# Patient Record
Sex: MALE | Race: WHITE
[De-identification: names, ages, dates, MRNs, and addresses within clinical notes are randomized per-mention and may not be internally consistent; named-entity substitution may affect disease eponyms.]

---

## 2019-08-27 ENCOUNTER — HOSPITAL ENCOUNTER (OUTPATIENT)
Dept: HOSPITAL 59 - SUR | Age: 49
LOS: 1 days | Discharge: HOME | End: 2019-08-28
Attending: ORTHOPAEDIC SURGERY
Payer: COMMERCIAL

## 2019-08-27 DIAGNOSIS — Z95.1: ICD-10-CM

## 2019-08-27 DIAGNOSIS — E78.00: ICD-10-CM

## 2019-08-27 DIAGNOSIS — K21.9: ICD-10-CM

## 2019-08-27 DIAGNOSIS — L40.9: ICD-10-CM

## 2019-08-27 DIAGNOSIS — E11.9: ICD-10-CM

## 2019-08-27 DIAGNOSIS — I10: ICD-10-CM

## 2019-08-27 DIAGNOSIS — I25.2: ICD-10-CM

## 2019-08-27 DIAGNOSIS — I25.10: ICD-10-CM

## 2019-08-27 DIAGNOSIS — M17.12: Primary | ICD-10-CM

## 2019-08-27 LAB
ABO GROUP: (no result)
ANTIBODY SCREEN: NEGATIVE
RH TYPE: NEGATIVE

## 2019-08-27 PROCEDURE — 64447 NJX AA&/STRD FEMORAL NRV IMG: CPT

## 2019-08-27 PROCEDURE — 86900 BLOOD TYPING SEROLOGIC ABO: CPT

## 2019-08-27 PROCEDURE — 36416 COLLJ CAPILLARY BLOOD SPEC: CPT

## 2019-08-27 PROCEDURE — 86850 RBC ANTIBODY SCREEN: CPT

## 2019-08-27 PROCEDURE — 27447 TOTAL KNEE ARTHROPLASTY: CPT

## 2019-08-27 PROCEDURE — 86901 BLOOD TYPING SEROLOGIC RH(D): CPT

## 2019-08-27 PROCEDURE — 82948 REAGENT STRIP/BLOOD GLUCOSE: CPT

## 2019-08-27 PROCEDURE — 85014 HEMATOCRIT: CPT

## 2019-08-27 PROCEDURE — 84132 ASSAY OF SERUM POTASSIUM: CPT

## 2019-08-27 PROCEDURE — 85018 HEMOGLOBIN: CPT

## 2019-08-27 PROCEDURE — 80048 BASIC METABOLIC PNL TOTAL CA: CPT

## 2019-08-27 PROCEDURE — 01402 ANES OPN/ARTH TOT KNE ARTHRP: CPT

## 2019-08-27 PROCEDURE — 20680 REMOVAL OF IMPLANT DEEP: CPT

## 2019-08-27 PROCEDURE — 76942 ECHO GUIDE FOR BIOPSY: CPT

## 2019-08-27 RX ADMIN — INSULIN HUMAN SCH UNIT: 100 INJECTION, SOLUTION PARENTERAL at 17:10

## 2019-08-27 RX ADMIN — HYDROCODONE BITARTRATE AND ACETAMINOPHEN PRN EACH: 325; 10 TABLET ORAL at 20:43

## 2019-08-27 RX ADMIN — HYDROCODONE BITARTRATE AND ACETAMINOPHEN PRN EACH: 325; 10 TABLET ORAL at 16:54

## 2019-08-27 RX ADMIN — LOSARTAN POTASSIUM SCH MG: 25 TABLET, FILM COATED ORAL at 22:12

## 2019-08-27 RX ADMIN — DOCUSATE SODIUM SCH MG: 100 TABLET, FILM COATED ORAL at 22:12

## 2019-08-27 RX ADMIN — METFORMIN HYDROCHLORIDE SCH MG: 500 TABLET ORAL at 22:13

## 2019-08-27 RX ADMIN — CEFAZOLIN SODIUM SCH MLS/HR: 2 SOLUTION INTRAVENOUS at 19:58

## 2019-08-27 RX ADMIN — DEXTROSE AND SODIUM CHLORIDE PRN MLS/HR: 5; .9 INJECTION, SOLUTION INTRAVENOUS at 19:45

## 2019-08-27 RX ADMIN — METOPROLOL TARTRATE SCH MG: 50 TABLET, FILM COATED ORAL at 22:13

## 2019-08-27 NOTE — REHAB EVALUATION
Patient Information





- Patient Information


Diagnosis: L knee DJD


Ordered Treatment: PT Evaluate and Treat


Status: Initial Evaluation


Surgery: Yes (L TKA)


Date of Surgery: 08/27/19


Past Medical/Surgical Hx: 


                          PAST MEDICAL/SURGICAL HISTORY





Past Surgical History            ACL RECONSTRUCTION LEFT KNEE


                                 MULTIPLE SCOPES LEFT KNEE


                                 QUINTEN


                                 QUAD BYPASS 2016


                                 EGD





PMH - Respiratory





Hx Respiratory Disorders         Yes


Hx Bronchitis                    Yes: NOTHING RECENT





PMH - Cardiovascular





Hx Cardiovascular Disorders      Yes


Hx Abnormal EKG                  Yes


Hx Cardiac Catheterization       Yes


Hx Heart Attack                  Yes: 2016


Hx Hypertension                  Yes: FAIR CONTROL ON MEDS


Hx Coronary Artery Disease       Yes


Hx Coronary Artery Bypass        Yes: QUAD 2016


Graft                            


Exercise Tolerance               Good





PMH - Neuro





Hx Neurological Disorders        Yes


Hx Headaches                     Yes: OCCASSIONALLY





PMH - GI





Hx Gastrointestinal Disorders    Yes


Hx Gastroesophageal Reflux       Yes: FAIR CONTROL ON MEDS





PMH - 





Hx Genitourinary Disorders       No





PMH - Endocrine





Hx Endocrine Disorders           Yes


Hx Diabetes                      Yes: DX'D MANY YEARS AGO


Hx of NIDDM                      Yes


Comment:                         DOESNT CHECK BLOOD SUGARS LAST A1C 7.9





PMH - Musculoskeletal





Hx Musculoskeletal Disorders     Yes


Hx Arthritis                     Yes: LEFT KNEE, HANDS


Hx Gout                          Yes





PMH - Psych





Hx Psychiatric Problems          Yes


Hx Depression                    Yes





PMH - Hematology/Oncology





Hx Hematology/Oncology           Yes


Disorders                        


Hx Blood Transfusion Reaction    No








Premorbid Status: Detail (The patient was independent with all mobility prior to

surgery.)


Social History: Detail (The patient lives alone in an apartment  with 20 steps 

at the enterance and one handrail. The bathroom is equipped with : a tub/shower 

combination, hand held shower, standard height toilet. There are no grab bars 

present in the bathroom. The patient has crutches.)


Precautions: Universal, Fall, Other (WBAT on the L LE.)





- Time With Patient


Total Time Spent With Patient (Min): 25


Treatment Procedures: Detail





Subjective Information





- Subjective Information


Per Patient (The patient had complaints of level 10 pain primarily in L hip.)





Objective Data





- Mental Status


Patient Orientation: Oriented x3





- Visual Perception


Appears within normal limits for therapeutic activities





- ROM


Not within normal limits (The patient's L knee is limited s/p surgery. All other

LE AROM is WNL.)





- Strength/Tone


Not within normal limits (The patient's LE strength was not tested however, was 

functional.)





- Bed Mobility


Independent (The patient was independent with supine to and from sit and 

scooting up in bed.)





- Transfers


Independent (The patient was independent with sit to and from stand transfer.)





- Balance


Balance Sitting: Good


Balance Standing: Good





- Sensation


Intact





- Gait


Detail (The patient ambulated around bed to chair aprox. 15 feet with CG for 

safety with crutches and TTWB on L LE (TTWB due to pain complaints). The patient

did have reports of decreased pain with ambulation and sat in chair apox 4 

minutes before complaining of nausea. Patient returned to bed. Ice packs were in

place and call light was within reach. RN was contacted and returned with pain 

medications.)





Therapy Assessment





- Therapy Assessment


Detail (Patient was independent with bed mobility and transfers. Ambulation was 

limited secondary to pain complaints. Feel the patient will porgress well once 

pain is controlled.)





Problem List





- Problem List


Physical Therapy Problem List: Detail (1) Decreased L knee AROM  2) Decreased L 

LE strength)





Goals





- Goals


Physical Therapy Goals: 1) The patient will ambulate community distances WBAT on

the L LE independently with appropriate distances.  2) The patient will ambulate

on a flight of stairs using proper technique with supervision for safety.  3) 

The patient will be independent with TKA HEP.

## 2019-08-28 LAB
ANION GAP SERPL CALC-SCNC: 15 MMOL/L (ref 7–16)
BUN SERPL-MCNC: 18 MG/DL (ref 6–20)
CO2 SERPL-SCNC: 21 MMOL/L (ref 22–29)
CREAT SERPL-MCNC: 1 MG/DL (ref 0.7–1.2)
EST GLOMERULAR FILTRATION RATE: > 60 ML/MIN
GLUCOSE SERPL-MCNC: 292 MG/DL (ref 74–109)
HCT VFR BLD CALC: 36.9 % (ref 42–52)
HGB BLD-MCNC: 12.2 GM/DL (ref 14–18)

## 2019-08-28 RX ADMIN — DOCUSATE SODIUM SCH MG: 100 TABLET, FILM COATED ORAL at 09:15

## 2019-08-28 RX ADMIN — METOPROLOL TARTRATE SCH MG: 50 TABLET, FILM COATED ORAL at 09:15

## 2019-08-28 RX ADMIN — INSULIN HUMAN SCH UNIT: 100 INJECTION, SOLUTION PARENTERAL at 07:31

## 2019-08-28 RX ADMIN — CEFAZOLIN SODIUM SCH MLS/HR: 2 SOLUTION INTRAVENOUS at 03:37

## 2019-08-28 RX ADMIN — CEFAZOLIN SODIUM SCH MLS/HR: 2 SOLUTION INTRAVENOUS at 11:20

## 2019-08-28 RX ADMIN — HYDROCODONE BITARTRATE AND ACETAMINOPHEN PRN EACH: 325; 10 TABLET ORAL at 09:02

## 2019-08-28 RX ADMIN — DEXTROSE AND SODIUM CHLORIDE PRN MLS/HR: 5; .9 INJECTION, SOLUTION INTRAVENOUS at 03:34

## 2019-08-28 RX ADMIN — LOSARTAN POTASSIUM SCH MG: 25 TABLET, FILM COATED ORAL at 09:15

## 2019-08-28 RX ADMIN — HYDROCODONE BITARTRATE AND ACETAMINOPHEN PRN EACH: 325; 10 TABLET ORAL at 13:28

## 2019-08-28 RX ADMIN — METFORMIN HYDROCHLORIDE SCH MG: 500 TABLET ORAL at 09:15

## 2019-08-28 RX ADMIN — HYDROCODONE BITARTRATE AND ACETAMINOPHEN PRN EACH: 325; 10 TABLET ORAL at 03:33

## 2019-08-28 RX ADMIN — INSULIN HUMAN SCH UNIT: 100 INJECTION, SOLUTION PARENTERAL at 11:07

## 2019-08-28 NOTE — REHAB EVALUATION
Patient Information





- Patient Information


Diagnosis: L knee DJD


Ordered Treatment: OT Evaluate and Treat


Status: Initial Evaluation


Surgery: Yes (L TKA)


Date of Surgery: 08/27/19


Past Medical/Surgical Hx: 


                          PAST MEDICAL/SURGICAL HISTORY





Past Surgical History            ACL RECONSTRUCTION LEFT KNEE


                                 MULTIPLE SCOPES LEFT KNEE


                                 QUINTEN


                                 QUAD BYPASS 2016


                                 EGD





PMH - Respiratory





Hx Respiratory Disorders         Yes


Hx Bronchitis                    Yes: NOTHING RECENT





PMH - Cardiovascular





Hx Cardiovascular Disorders      Yes


Hx Abnormal EKG                  Yes


Hx Cardiac Catheterization       Yes


Hx Heart Attack                  Yes: 2016


Hx Hypertension                  Yes: FAIR CONTROL ON MEDS


Hx Coronary Artery Disease       Yes


Hx Coronary Artery Bypass        Yes: QUAD 2016


Graft                            


Exercise Tolerance               Good





PMH - Neuro





Hx Neurological Disorders        Yes


Hx Headaches                     Yes: OCCASSIONALLY





PMH - GI





Hx Gastrointestinal Disorders    Yes


Hx Gastroesophageal Reflux       Yes: FAIR CONTROL ON MEDS





PMH - 





Hx Genitourinary Disorders       No





PMH - Endocrine





Hx Endocrine Disorders           Yes


Hx Diabetes                      Yes: DX'D MANY YEARS AGO


Hx of NIDDM                      Yes


Comment:                         DOESNT CHECK BLOOD SUGARS LAST A1C 7.9





PMH - Musculoskeletal





Hx Musculoskeletal Disorders     Yes


Hx Arthritis                     Yes: LEFT KNEE, HANDS


Hx Gout                          Yes





PMH - Psych





Hx Psychiatric Problems          Yes


Hx Depression                    Yes





PMH - Hematology/Oncology





Hx Hematology/Oncology           Yes


Disorders                        


Hx Blood Transfusion Reaction    No








Premorbid Status: Detail (The patient was independent with all ADLs and 

functional mobility prior to surgery.)


Social History: Detail (The patient lives alone in an apartment with 20 steps at

the entrance with a R side handrail. The bathroom is equipped with a tub/shower 

combination, shower chair, hand held shower, and standard height toilet. There 

are no grab bars present in the bathroom. The patient has crutches and his wife 

will be getting him a walker.)


Precautions: Universal, Fall, Other (WBAT on the L LE.)





- Time With Patient


Total Time Spent With Patient (Min): 18 (1 eval low)


Treatment Procedures: Detail (OT eval: low complexity)





Subjective Information





- Subjective Information


Per Patient (Ok to see per RN Semaj.  Pt agreeable to OT eval.)





Objective Data





- Pain


Pain Present: Yes


Pain Scale Used: Numeric (1 - 10) (6/10, just received Norco, educated Pt on 

benefits of ice for knee to decrease swelling/pain)





- Mental Status


Patient Orientation: Oriented x3





- Visual Perception


Appears within normal limits for therapeutic activities





- ROM


Within normal limits (B UE)





- Strength/Tone


Within normal limits





- Coordination


Appears within normal limits for therapeutic activities





- Bed Mobility


Independent





- Transfers


Independent (Sit-stand to FWW, good balance, no safety concerns)





- Balance


Balance Sitting: Good


Balance Standing: Fair (walker support in standing)





- Sensation


Intact





- Gait


Detail (Functional mobility within bedroom with FWW, good balance and safety 

awareness.)





- ADL's/IADL's


Detail (OT educates Pt on techniques for LB dressing, kitchen and bathroom 

safety upon DC, Pt demos/verbalizes understanding.  Bathes at baseline, 

therapist educates Pt on benefits of seated showers initially at MD. Also 

educated Pt on where to obtain suction grab bar for shower safety.)





Therapy Assessment





- Therapy Assessment


Detail (Pt demos safety and MOD I with LB dressing and verbalizes safe technique

for home tasks with good safety awareness.)





Patient Education





- Patient Education


Teaching Topic: Other (modified tech for I/ADLs)


Response: Return Demonstration, Verbalize Understanding


Teaching Method: Discussion, Demonstration


Teaching Recipient: Patient


Barriers To Learning: None





Problem List





- Problem List


Physical Therapy Problem List: Detail (1) Decreased L knee AROM  2) Decreased L 

LE strength)


Occupational Therapy Problem List: Detail (No further skilled IP OT needs 

identified.)





Goals





- Goals


Physical Therapy Goals: 1) The patient will ambulate community distances WBAT on

the L LE independently with appropriate distances.  2) The patient will ambulate

on a flight of stairs using proper technique with supervision for safety.  3) 

The patient will be independent with TKA HEP.


Occupational Therapy Goals: No further skilled IP OT needs/goals identified.





Prognosis





- Prognosis


Good





Plan





- Plan


Occupational Therapy Plan: No further skilled IP OT needs identified, MD OT 

services.  Thank you for this referral.

## 2019-08-28 NOTE — OPERATIVE NOTE
DATE OF SURGERY: 08/27/2019 



PREOPERATIVE DIAGNOSIS: End-stage arthrosis of the left knee. 



POSTOPERATIVE DIAGNOSIS: End-stage arthrosis of the left knee. 



OPERATION: 

1. Cemented left total knee arthroplasty using [employer] Julianna II 
components with a size 7 Oxinium femur, a size 7 stemmed tibia baseplate, a 9 mm
lipped highly crosslinked tibial insert, and a 35 mm all-plastic patella. 

2. Removal of deep buried hardware of the proximal tibia. 



STAFF SURGEON: Edinson Dorman MD 



FIRST ASSISTANT: Mrs. Francoise Davila



ANESTHESIA: Spinal.



PREPARATION: Chloraprep. 



INDIVIDUAL CONSIDERATIONS: None. 



PROCEDURE: The patient was taken to the operating room, placed supine on the 
operating room table. He had a successful induction of a general anesthetic. His
left lower extremity was prepped and draped in the usual fashion. 



The limb was elevated and tourniquet was inflated to 250 mmHg. The patient had a
midline approach to the knee, the distal half, used the previous scar. Sharp 
dissection carried down through skin and subcutaneous tissue. Small veins were 
coagulated with a Bovie. A medial arthrotomy was performed. The patella was 
everted and the knee was flexed. He had exposed bone in the patellofemoral and 
especially lateral compartments and some in the medial compartment. Remaining 
fat pad was resected, ACL was sacrificed, and provisional anterior 
meniscectomies were performed. The capsule was released from the medial proximal
tibia. Subperiosteally, I was able to find the medial ACL screw, and this was 
easily removed. The initial femoral  hole was then made freehand. The 
intramedullary femoral cutting jig was placed. It was cut in 7.0 degrees of 
valgus and adjusted for rotation and secured with pins for a 10 mm resection. 
The initial transverse cut was then made. The skin guide was placed in the 
anterior and posterior  holes. It was found that a size 7 would be 
appropriate. The anterior and posterior cuts followed by chamfer cuts were made.
Osteophytes removed, and a size 7 trial was placed and found to fit well. 



The tibia was brought forward, and the remainder of the meniscal remnants 
removed with a Bovie. The extraarticular tibial cutting jig was placed. It was 
cut in neutral with a 3-degree AP slope. Care was taken to adjust for rotation 
and flexion using the extraarticular alignment guide and bony landmarks. It was 
set for a 9 mm resection keyed off the high lateral side. When cutting the 
tibia, care was taken to preserve the PCL insertion on the tibia. Large 
osteophytes were removed, and after that I was able to fit a size 7 trial. It 
was adjusted for rotation and secured with pins. With a 9 mm trial and femoral 
trial, there was excellent motion and stability, ligamentous balance, and 
rotation alignment were normal. No lateral release was required. 



The patient had a thick patella and roughly 9 mm of bone was removed freehand. I
could easily fit a 35 patella. The 3  holes were drilled. The tourniquet 
was let down briefly to get bleeders posteriorly and then placed back up again. 
The knee was then thoroughly irrigated out with pulsatile Betadine and saline to
remove any visual or palpable debris. Bony surfaces were then dried. A size 7 
stemmed tibia baseplate was cemented into place followed by impaction of the 9 
mm lipped highly crosslinked tibial insert followed by cementing in the size 7 
Oxinium femur followed by cementing in the 35 mm patella. The implant surfaces 
were compressed, excess cement was removed. After the cement had set, there was 
excellent motion and stability, ligamentous balance, rotation alignment, and 
patellofemoral tracking were normal. No lateral release was required. Again 
thorough irrigation to remove any visual or palpable debris. Tourniquet was let 
down. Hemostasis was obtained with a Bovie. I then infiltrated the skin and 
subcutaneous tissue with 30 mL of 0.5% Marcaine with epinephrine. The capsule 
was then closed with a running #2 quill, subcu was closed in layers with running
0 quill, skin was closed with staples. Then 1 g of tranexamic acid was mixed 
with 30 mL of saline and injected into the knee through a sterile 18-gauge 
needle, and a sterile bulky compressive ADIS-type dressing was applied. The 
patient tolerated the procedure well. Needle and sponge counts were correct. 
Estimated blood loss was 75 mL. He was taken back to recovery in good condition.
There were no complications. 

DRE

## 2019-08-28 NOTE — PHYSICAL THERAPY TX NOTE
Physical Therapy Tx Note





- Treatment Note


Tolerated: Good


Total Time Spent With Patient: 25


Physical Therapy Tx Note: Detail (The patient was up in chair when PT arrived. 

The patient ambulated with front wheeled walker a distance of 108 feet x 1 with 

WBAT on the L LE independently. The patient ambulated on a flight of 3 steps, 7 

steps and 3 steps with use of one railing and one crutch with supervision for 

safety only. The patient's TKA HEP was reviewed and patient exhibited good 

understanding of the following: quad sets, hamstring sets, ankle pumps, gluteal 

sets, seated heel slides and SLR. The patient has met all inpatient PT goals and

is discharged from inpatient PT.)


Physical Therapy Problem List: Detail (1) Decreased L knee AROM  2) Decreased L 

LE strength)


Physical Therapy Goals: 1) The patient will ambulate community distances WBAT on

the L LE independently with appropriate distances.(Goal Met).  2) The patient 

will ambulate on a flight of stairs using proper technique with supervision for 

safety.(Goal Met).  3) The patient will be independent with TKA HEP.(Goal Met)


Physical Therapy Plan: The patient is discharged from inpatient PT and is to 

continue with Home PT.